# Patient Record
Sex: FEMALE | Race: WHITE | URBAN - METROPOLITAN AREA
[De-identification: names, ages, dates, MRNs, and addresses within clinical notes are randomized per-mention and may not be internally consistent; named-entity substitution may affect disease eponyms.]

---
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
## 2024-01-16 ENCOUNTER — HOSPITAL ENCOUNTER (EMERGENCY)
Facility: OTHER | Age: 19
Discharge: HOME | End: 2024-01-16
Attending: EMERGENCY MEDICINE
Payer: COMMERCIAL

## 2024-01-16 ENCOUNTER — OFFICE VISIT (OUTPATIENT)
Dept: URGENT CARE | Facility: CLINIC | Age: 19
End: 2024-01-16
Payer: COMMERCIAL

## 2024-01-16 VITALS
HEART RATE: 76 BPM | OXYGEN SATURATION: 100 % | DIASTOLIC BLOOD PRESSURE: 71 MMHG | TEMPERATURE: 97 F | RESPIRATION RATE: 18 BRPM | SYSTOLIC BLOOD PRESSURE: 118 MMHG

## 2024-01-16 VITALS
BODY MASS INDEX: 22.22 KG/M2 | WEIGHT: 150 LBS | SYSTOLIC BLOOD PRESSURE: 114 MMHG | OXYGEN SATURATION: 99 % | TEMPERATURE: 98 F | HEART RATE: 76 BPM | DIASTOLIC BLOOD PRESSURE: 72 MMHG | RESPIRATION RATE: 18 BRPM | HEIGHT: 69 IN

## 2024-01-16 DIAGNOSIS — R07.9 CHEST PAIN: ICD-10-CM

## 2024-01-16 DIAGNOSIS — M43.6 TORTICOLLIS: Primary | ICD-10-CM

## 2024-01-16 DIAGNOSIS — R07.89 CHEST WALL PAIN: Primary | ICD-10-CM

## 2024-01-16 DIAGNOSIS — R06.89 BREATHING DIFFICULTY: ICD-10-CM

## 2024-01-16 DIAGNOSIS — R07.89 TENDERNESS OF CHEST WALL: ICD-10-CM

## 2024-01-16 DIAGNOSIS — R07.1 PAINFUL BREATHING: ICD-10-CM

## 2024-01-16 LAB
ALBUMIN SERPL BCP-MCNC: 4.1 G/DL (ref 3.5–5.2)
ALP SERPL-CCNC: 48 U/L (ref 55–135)
ALT SERPL W/O P-5'-P-CCNC: 17 U/L (ref 10–44)
ANION GAP SERPL CALC-SCNC: 8 MMOL/L (ref 8–16)
AST SERPL-CCNC: 14 U/L (ref 10–40)
B-HCG UR QL: NEGATIVE
BASOPHILS # BLD AUTO: 0.02 K/UL (ref 0–0.2)
BASOPHILS NFR BLD: 0.3 % (ref 0–1.9)
BILIRUB SERPL-MCNC: 0.9 MG/DL (ref 0.1–1)
BNP SERPL-MCNC: 29 PG/ML (ref 0–99)
BUN SERPL-MCNC: 14 MG/DL (ref 6–20)
CALCIUM SERPL-MCNC: 9.4 MG/DL (ref 8.7–10.5)
CHLORIDE SERPL-SCNC: 108 MMOL/L (ref 95–110)
CO2 SERPL-SCNC: 23 MMOL/L (ref 23–29)
CREAT SERPL-MCNC: 0.7 MG/DL (ref 0.5–1.4)
CTP QC/QA: YES
CTP QC/QA: YES
D DIMER PPP IA.FEU-MCNC: 0.45 MG/L FEU
DIFFERENTIAL METHOD BLD: ABNORMAL
EOSINOPHIL # BLD AUTO: 0 K/UL (ref 0–0.5)
EOSINOPHIL NFR BLD: 0.4 % (ref 0–8)
ERYTHROCYTE [DISTWIDTH] IN BLOOD BY AUTOMATED COUNT: 12.5 % (ref 11.5–14.5)
EST. GFR  (NO RACE VARIABLE): >60 ML/MIN/1.73 M^2
GLUCOSE SERPL-MCNC: 101 MG/DL (ref 70–110)
HCT VFR BLD AUTO: 35.1 % (ref 37–48.5)
HGB BLD-MCNC: 11.9 G/DL (ref 12–16)
IMM GRANULOCYTES # BLD AUTO: 0.01 K/UL (ref 0–0.04)
IMM GRANULOCYTES NFR BLD AUTO: 0.1 % (ref 0–0.5)
LYMPHOCYTES # BLD AUTO: 0.7 K/UL (ref 1–4.8)
LYMPHOCYTES NFR BLD: 9.8 % (ref 18–48)
MCH RBC QN AUTO: 29.8 PG (ref 27–31)
MCHC RBC AUTO-ENTMCNC: 33.9 G/DL (ref 32–36)
MCV RBC AUTO: 88 FL (ref 82–98)
MONOCYTES # BLD AUTO: 0.5 K/UL (ref 0.3–1)
MONOCYTES NFR BLD: 7 % (ref 4–15)
NEUTROPHILS # BLD AUTO: 5.6 K/UL (ref 1.8–7.7)
NEUTROPHILS NFR BLD: 82.4 % (ref 38–73)
NRBC BLD-RTO: 0 /100 WBC
PLATELET # BLD AUTO: 232 K/UL (ref 150–450)
PMV BLD AUTO: 9 FL (ref 9.2–12.9)
POTASSIUM SERPL-SCNC: 4 MMOL/L (ref 3.5–5.1)
PROT SERPL-MCNC: 7.2 G/DL (ref 6–8.4)
RBC # BLD AUTO: 4 M/UL (ref 4–5.4)
SARS-COV-2 AG RESP QL IA.RAPID: NEGATIVE
SODIUM SERPL-SCNC: 139 MMOL/L (ref 136–145)
TROPONIN I SERPL DL<=0.01 NG/ML-MCNC: <0.006 NG/ML (ref 0–0.03)
WBC # BLD AUTO: 6.83 K/UL (ref 3.9–12.7)

## 2024-01-16 PROCEDURE — 25000003 PHARM REV CODE 250

## 2024-01-16 PROCEDURE — 85025 COMPLETE CBC W/AUTO DIFF WBC: CPT

## 2024-01-16 PROCEDURE — 99213 OFFICE O/P EST LOW 20 MIN: CPT | Mod: S$GLB,,, | Performed by: NURSE PRACTITIONER

## 2024-01-16 PROCEDURE — 93010 ELECTROCARDIOGRAM REPORT: CPT | Mod: ,,, | Performed by: INTERNAL MEDICINE

## 2024-01-16 PROCEDURE — 99285 EMERGENCY DEPT VISIT HI MDM: CPT | Mod: 25

## 2024-01-16 PROCEDURE — 93005 ELECTROCARDIOGRAM TRACING: CPT

## 2024-01-16 PROCEDURE — 87811 SARS-COV-2 COVID19 W/OPTIC: CPT | Mod: QW,S$GLB,, | Performed by: NURSE PRACTITIONER

## 2024-01-16 PROCEDURE — 81025 URINE PREGNANCY TEST: CPT

## 2024-01-16 PROCEDURE — 83880 ASSAY OF NATRIURETIC PEPTIDE: CPT

## 2024-01-16 PROCEDURE — 84484 ASSAY OF TROPONIN QUANT: CPT

## 2024-01-16 PROCEDURE — 85379 FIBRIN DEGRADATION QUANT: CPT

## 2024-01-16 PROCEDURE — 80053 COMPREHEN METABOLIC PANEL: CPT

## 2024-01-16 RX ORDER — CYCLOBENZAPRINE HCL 5 MG
5 TABLET ORAL NIGHTLY
Qty: 7 TABLET | Refills: 0 | Status: SHIPPED | OUTPATIENT
Start: 2024-01-16 | End: 2024-01-23

## 2024-01-16 RX ORDER — METHOCARBAMOL 500 MG/1
500 TABLET, FILM COATED ORAL
Status: COMPLETED | OUTPATIENT
Start: 2024-01-16 | End: 2024-01-16

## 2024-01-16 RX ORDER — IBUPROFEN 600 MG/1
600 TABLET ORAL 3 TIMES DAILY
Qty: 9 TABLET | Refills: 0 | Status: SHIPPED | OUTPATIENT
Start: 2024-01-16 | End: 2024-01-19

## 2024-01-16 RX ORDER — IBUPROFEN 200 MG
600 TABLET ORAL
Status: COMPLETED | OUTPATIENT
Start: 2024-01-16 | End: 2024-01-16

## 2024-01-16 RX ORDER — LEVONORGESTREL/ETHIN.ESTRADIOL 0.1-0.02MG
1 TABLET ORAL DAILY
COMMUNITY

## 2024-01-16 RX ORDER — BUSPIRONE HYDROCHLORIDE 10 MG/1
10 TABLET ORAL 3 TIMES DAILY
COMMUNITY

## 2024-01-16 RX ADMIN — Medication 600 MG: at 09:01

## 2024-01-16 RX ADMIN — METHOCARBAMOL 500 MG: 500 TABLET ORAL at 09:01

## 2024-01-16 NOTE — PROGRESS NOTES
"Subjective:      Patient ID: Michaela Jc is a 18 y.o. female.    Vitals:  height is 5' 9" (1.753 m) and weight is 68 kg (150 lb). Her oral temperature is 98.2 °F (36.8 °C). Her blood pressure is 114/72 and her pulse is 76. Her respiration is 18 and oxygen saturation is 99%.     Chief Complaint: Breathing Problem    Patient present with be unable to take deep breath pain stay to the right side chest and upper back she started having neck pain three days ago thought that she slept wrong.  The last two days ago that she it worst at night not being able to breath has not anything going on.  Provider note begins below    Denies trauma or injury.  No fevers, cough or rash.  Patient feels like initially it started in her neck.  She felt maybe that she slept wrong.  She is tried 600 mg of ibuprofen 2 separate times.  To go back to sleep after taking it.    Breathing Problem  She complains of difficulty breathing and shortness of breath. There is no chest tightness, cough, frequent throat clearing, hemoptysis, hoarse voice, sputum production or wheezing. Episode onset: 3 days. The problem has been gradually worsening. Associated symptoms include myalgias. Pertinent negatives include no appetite change, chest pain, dyspnea on exertion, ear congestion, ear pain, fever, headaches, heartburn, malaise/fatigue, nasal congestion, orthopnea, PND, postnasal drip, rhinorrhea, sneezing, sore throat, sweats, trouble swallowing or weight loss. Associated symptoms comments: When trying to take a deep breath or just laying down . Her symptoms are aggravated by nothing. Her symptoms are alleviated by nothing. She reports no improvement on treatment. There is no history of asthma, bronchiectasis, bronchitis, COPD, emphysema or pneumonia.       Constitution: Negative for appetite change, sweating, fatigue and fever.   HENT:  Negative for ear pain, postnasal drip, sore throat and trouble swallowing.    Cardiovascular:  Negative for chest " trauma, chest pain, leg swelling, palpitations and sob on exertion.   Respiratory:  Positive for shortness of breath. Negative for cough, sputum production, bloody sputum and wheezing.    Gastrointestinal:  Negative for nausea, vomiting, constipation, diarrhea and heartburn.   Musculoskeletal:  Positive for muscle ache.   Skin:  Negative for rash.   Allergic/Immunologic: Negative for sneezing.   Neurological:  Negative for headaches.      Objective:     Physical Exam   Constitutional: She is oriented to person, place, and time.   HENT:   Head: Normocephalic and atraumatic.   Neck:       Cardiovascular: Normal rate and regular rhythm.   Pulmonary/Chest: Effort normal and breath sounds normal. No stridor. No respiratory distress. She has no decreased breath sounds. She has no wheezes. She has no rales. She exhibits tenderness.       Abdominal: Normal appearance.   Neurological: She is alert and oriented to person, place, and time.   Skin: Skin is warm and dry.   Psychiatric: Her behavior is normal. Mood normal.           Assessment:     1. Torticollis    2. Painful breathing    3. Tenderness of chest wall    4. Breathing difficulty        Plan:   COVID test negative  Patient declines Toradol injection   Will do ibuprofen 3 times a day for 3 days   Muscle relaxer   contoured pillow   Chest x-ray today.  Appointment made for chest x-ray through clinic .        Torticollis  -     X-Ray Chest PA And Lateral; Future; Expected date: 01/16/2024  -     ibuprofen (ADVIL,MOTRIN) 600 MG tablet; Take 1 tablet (600 mg total) by mouth 3 (three) times daily. for 3 days  Dispense: 9 tablet; Refill: 0  -     cyclobenzaprine (FLEXERIL) 5 MG tablet; Take 1 tablet (5 mg total) by mouth nightly. for 7 doses  Dispense: 7 tablet; Refill: 0  -     ibuprofen tablet 600 mg    Painful breathing  -     SARS Coronavirus 2 Antigen, POCT Manual Read    Tenderness of chest wall  -     X-Ray Chest PA And Lateral; Future; Expected date:  01/16/2024  -     ibuprofen (ADVIL,MOTRIN) 600 MG tablet; Take 1 tablet (600 mg total) by mouth 3 (three) times daily. for 3 days  Dispense: 9 tablet; Refill: 0  -     cyclobenzaprine (FLEXERIL) 5 MG tablet; Take 1 tablet (5 mg total) by mouth nightly. for 7 doses  Dispense: 7 tablet; Refill: 0    Breathing difficulty

## 2024-01-16 NOTE — ED PROVIDER NOTES
Source of History:  Patient    Chief complaint:  Chest Pain (Reports R upper chest pain x 4 days. States its difficult to take a deep breath due to pain. Had R shoulder pain but states its resolved. Sent from  for further evaluation. Hx of asthma)      HPI:  Michaela Jc is a 18 y.o. female presenting with right-sided chest pain x3 days.  Patient states that she woke up 3 days ago and was having sharp pain to the right upper chest, right neck, and right shoulder.  States that the pain to her neck and shoulder have resolved, but she persists with pain to her right upper chest.  She reports the pain is worse with deep breathing.  Does not worsen with ambulation.  Denies any pain with movement of her right arm.  Denies any shortness of breath.  Reports that she went to urgent care earlier today, who ordered a chest x-ray, however there equipment was not working and therefore she was advised to present here.  She denies any cough, congestion, fever, chills, abdominal pain, nausea, vomiting, diarrhea.  Denies any trauma or injury, recent increased physical activity, heavy lifting.  Reports that she does take oral birth control along with medication for anxiety.    This is the extent to the patients complaints today here in the emergency department.    PMH:  As per HPI and below:  History reviewed. No pertinent past medical history.  History reviewed. No pertinent surgical history.    Social History     Tobacco Use    Smoking status: Never     Passive exposure: Never    Smokeless tobacco: Never   Substance Use Topics    Alcohol use: Yes    Drug use: Never       Review of patient's allergies indicates:  No Known Allergies    ROS: As per HPI and below:  Constitutional: No fever.  No chills.  Eyes: No visual changes.  ENT: No sore throat. No ear pain.  Cardiovascular:  Positive for chest pain.  Respiratory:  No shortness of breath.  GI: No abdominal pain.  No nausea or vomiting.  Genitourinary: No abnormal  urination.  Neurologic: No headache. No focal weakness.  No numbness.  MSK: no back pain.  Integument: No rashes or lesions.    Physical Exam:    /71   Pulse 76   Temp 97.4 °F (36.3 °C) (Oral)   Resp 18   LMP 12/16/2023 (Approximate)   SpO2 100%   Breastfeeding No   Vitals:    01/16/24 0930 01/16/24 1000 01/16/24 1015 01/16/24 1030   BP: (!) 115/55 130/73  118/71   Pulse: 88 80  84   Resp: 18      Temp: 97.4 °F (36.3 °C)      TempSrc: Oral      SpO2: 100%  100% 99%    01/16/24 1045   BP:    Pulse: 76   Resp:    Temp:    TempSrc:    SpO2: 100%       Nursing note and vital signs reviewed.  Constitutional: No acute distress.  Well-appearing, non-toxic.  Eyes: No conjunctival injection.  Extraocular muscles are intact.  ENT: Oropharynx clear.   Cardiovascular:  Regular rate and rhythm no murmurs gallops or rubs.  Chest/ Respiratory:  Clear to auscultation bilaterally without wheezing rhonchi or rales.  Tenderness to palpation of the right upper chest wall overlying the pectoralis major muscle, no overlying erythema, swelling, deformity, crepitus.  Abdomen: Soft.  Not distended.  Nontender.  No guarding.  No rebound. Non-peritoneal.  Musculoskeletal: Good range of motion all joints.  No deformities.  Neck supple.  No meningismus.  Skin: No rashes seen.  Good turgor.  No abrasions.  No ecchymoses.  Neuro: alert and oriented x3,  no focal neurological deficits.  Psych: Appropriate, conversant    Initial MDM:  Urgent evaluation of 18-year-old female with no significant past medical history presenting for evaluation of chest pain times 3 days.  Afebrile and hemodynamically stable.  Exam findings as described above.  Does have tenderness to palpation of the right anterior chest wall.  Suspect likely musculoskeletal etiology given this, however given patient reports pain with deep inspiration, we will obtain cardiac workup, D-dimer to rule out PE.  Patient on oral contraception therefore does not meet PERC  criteria.    Labs that have been ordered have been independently reviewed and interpreted by myself.    Labs Reviewed   CBC W/ AUTO DIFFERENTIAL - Abnormal; Notable for the following components:       Result Value    Hemoglobin 11.9 (*)     Hematocrit 35.1 (*)     MPV 9.0 (*)     Lymph # 0.7 (*)     Gran % 82.4 (*)     Lymph % 9.8 (*)     All other components within normal limits   COMPREHENSIVE METABOLIC PANEL - Abnormal; Notable for the following components:    Alkaline Phosphatase 48 (*)     All other components within normal limits   TROPONIN I   B-TYPE NATRIURETIC PEPTIDE   D DIMER, QUANTITATIVE   POCT URINE PREGNANCY       X-Ray Chest PA And Lateral   Final Result      No acute abnormality.         Electronically signed by: Bhaskar Jessica MD   Date:    01/16/2024   Time:    10:22          No results found for this or any previous visit.    Differential Diagnosis:  Differential Diagnosis includes, but is not limited to:  ACS/MI, PE, aortic dissection, pneumothorax, cardiac tamponade, pericarditis/myocarditis, pneumonia, infection/abscess, lung mass, costochondritis/pleurisy, GERD, biliary disease, pancreatitis    MDM  Medical Decision Making  Workup reassuring, see ED course for workup, no evidence of ACS, pneumothorax, pneumonia, PE.  Patient reported improvement in pain after Robaxin, was given ibuprofen at urgent care.  Given reassuring workup, reproducible pain, suspect likely musculoskeletal etiology of pain.  Patient was already prescribed ibuprofen and muscle relaxers at urgent Care, advised patient that she may continue these medications along with Tylenol for symptomatic treatment.  Advised ice/heat as well.  Referral provided to primary care given patient is a student here in it does not have a primary care provider here.  Patient was advised to follow up with PCP, strict ER return precautions given.  She verbalized understanding of discharge instructions and was in agreement. I discussed this case  with my attending provider, Dr. Avila, who was in agreement with plan.     Amount and/or Complexity of Data Reviewed  Labs: ordered. Decision-making details documented in ED Course.  Radiology: ordered.  ECG/medicine tests:  Decision-making details documented in ED Course.    Risk  Prescription drug management.        ED Course as of 01/16/24 1717   Tue Jan 16, 2024   0944 EKG 12-lead  Normal sinus rhythm with slight sinus arrhythmia present, rate of 69, no STEMI. [AK]   1038 Chest x-ray with no evidence consolidation, pleural effusion, pneumothorax. [SW]   1046 Preg Test, Ur: Negative [SW]   1046 CMP no significant electrolyte abnormality, normal renal function, no elevation in liver enzymes. [SW]   1046 CBC with very mild anemia, H&H of 11.9/35.1, otherwise unremarkable, no leukocytosis. [SW]   1047 D-Dimer: 0.45 [SW]   1047 Troponin I: <0.006 [SW]   1047 BNP: 29 [SW]      ED Course User Index  [AK] Sandra Avila MD  [] Juliana Feliciano PA-C       Diagnostic Impression:    1. Chest wall pain    2. Chest pain         ED Disposition Condition    Discharge Stable            ED Prescriptions    None       Follow-up Information       Follow up With Specialties Details Why Contact Info Additional Information    [facility] Medicine Internal Medicine Schedule an appointment as soon as possible for a visit in 1 week  [address] [phone] Internal Medicine - [facility], 8th Floor, [address] Please park in [place] and use [place] elevators    [facility] - Emergency Dept Emergency Medicine Go to  If symptoms worsen [address] [id number] [phone]              Juliana Feliciano PA-C  01/16/24 5232

## 2024-01-16 NOTE — ED NOTES
Pt to ED with R chest wall pain, pain with inspiration x4 days, seen at  and advised to come to ED for further eval. NAD noted, VSS. Denies sick contacts, cardiac Hx, trauma. Has only taking ibuprofen for pain.

## 2024-01-16 NOTE — DISCHARGE INSTRUCTIONS

## 2024-01-16 NOTE — LETTER
January 16, 2024      Urgent Care [address] [address]  Phone: [phone]  Fax: [phone]       Patient: Michaela Jc   YOB: 2005  Date of Visit: 01/16/2024    To Whom It May Concern:    Heraclio Jc  was at [facility] on 01/16/2024. The patient may return to work/school on 1/17/2024 with no restrictions. If you have any questions or concerns, or if I can be of further assistance, please do not hesitate to contact me.    Sincerely,    Charu Lam, NP

## 2024-01-16 NOTE — Clinical Note
"Michaela Crumpanda" Hosea was seen and treated in our emergency department on 1/16/2024.  She may return to school on 01/17/2024.      If you have any questions or concerns, please don't hesitate to call.      Juliana Feliciano, VENICE"

## 2024-01-16 NOTE — PATIENT INSTRUCTIONS
Recommend contoured pillow, warm compresses, stretching massage.  ibuprofen  Flexeril.    Follow-up if symptoms worsen or do not improve.  Upper body strengthening exercise

## 2024-04-15 ENCOUNTER — OFFICE VISIT (OUTPATIENT)
Dept: URGENT CARE | Facility: CLINIC | Age: 19
End: 2024-04-15
Payer: COMMERCIAL

## 2024-04-15 VITALS
DIASTOLIC BLOOD PRESSURE: 71 MMHG | OXYGEN SATURATION: 97 % | SYSTOLIC BLOOD PRESSURE: 122 MMHG | HEIGHT: 69 IN | BODY MASS INDEX: 22.22 KG/M2 | WEIGHT: 150 LBS | TEMPERATURE: 98 F | HEART RATE: 88 BPM | RESPIRATION RATE: 20 BRPM

## 2024-04-15 DIAGNOSIS — S83.91XA SPRAIN OF RIGHT KNEE, UNSPECIFIED LIGAMENT, INITIAL ENCOUNTER: ICD-10-CM

## 2024-04-15 DIAGNOSIS — M25.561 RIGHT KNEE PAIN, UNSPECIFIED CHRONICITY: Primary | ICD-10-CM

## 2024-04-15 PROCEDURE — 99214 OFFICE O/P EST MOD 30 MIN: CPT | Mod: S$GLB,,, | Performed by: FAMILY MEDICINE

## 2024-04-15 PROCEDURE — 73562 X-RAY EXAM OF KNEE 3: CPT | Mod: RT,S$GLB,, | Performed by: RADIOLOGY

## 2024-04-15 RX ORDER — NAPROXEN 375 MG/1
375 TABLET ORAL 2 TIMES DAILY
Qty: 20 TABLET | Refills: 0 | Status: SHIPPED | OUTPATIENT
Start: 2024-04-15

## 2024-04-15 NOTE — PROGRESS NOTES
"Subjective:      Patient ID: Michaela Jc is a 19 y.o. female.    Vitals:  height is 5' 9" (1.753 m) and weight is 68 kg (150 lb). Her temperature is 98.2 °F (36.8 °C). Her blood pressure is 122/71 and her pulse is 88. Her respiration is 20 and oxygen saturation is 97%.     Chief Complaint: Knee Pain    Pt presents with complaint of right knee pain.  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  States her knee joint suddenly gave way while standing 3 days ago.  States similar episode also happened 2 months ago.  Pt states she has pain medially and states she has not taken any medication for her pain.  Pt states she has no hx of surgeries or fractures to her knee.  Denies any history of gout or arthritis.    Knee Pain   The incident occurred 3 to 5 days ago. The incident occurred at home. There was no injury mechanism. The pain is present in the right knee. The quality of the pain is described as aching. The pain is at a severity of 5/10. The pain is moderate. The pain has been Fluctuating since onset. She reports no foreign bodies present. The symptoms are aggravated by palpation. She has tried nothing for the symptoms. The treatment provided no relief.     ROS   Objective:     Physical Exam   Constitutional: She is oriented to person, place, and time. She appears well-developed. She is cooperative.   HENT:   Head: Normocephalic and atraumatic.   Ears:   Right Ear: Hearing normal.   Left Ear: Hearing normal.   Nose: Nose normal. No mucosal edema or nasal deformity. No epistaxis. Right sinus exhibits no maxillary sinus tenderness and no frontal sinus tenderness. Left sinus exhibits no maxillary sinus tenderness and no frontal sinus tenderness.   Mouth/Throat: Uvula is midline, oropharynx is clear and moist and mucous membranes are normal. No trismus in the jaw. Normal dentition. No uvula swelling.   Eyes: Conjunctivae and lids are normal.   Neck: Trachea normal and phonation normal. Neck " supple.   Cardiovascular: Normal rate, regular rhythm, normal heart sounds and normal pulses.   No murmur heard.  Pulmonary/Chest: Effort normal and breath sounds normal. No stridor. No respiratory distress. She has no wheezes. She has no rhonchi. She has no rales.   Abdominal: Normal appearance and bowel sounds are normal. Soft.   Musculoskeletal: Normal range of motion.         General: Normal range of motion.      Comments:   R Knee Exam:  No significant swelling.   No redness, swelling, bruise.  No warmth.  No obvious effusion  +ve tenderness to   Joint laxity WNL  Negative Lachman's and posterior draw test  Painful varus and valgus stress test  No clicking, popping or crepitus noted  ROM fair.      Neurological: She is alert and oriented to person, place, and time. She exhibits normal muscle tone.   Skin: Skin is warm, dry and intact.   Psychiatric: Her speech is normal and behavior is normal. Judgment and thought content normal.   Nursing note and vitals reviewed.      Assessment:     1. Right knee pain, unspecified chronicity    2. Sprain of right knee, unspecified ligament, initial encounter        Plan:   Extra knees unremarkable.    Discussed exam findings/results/diagnosis/plan with patient.  Knee precautions advised.  Ortho referral given for further evaluation.. ER precautions given if symptoms get any worse. All questions answered. Patient verbally understood and agreed with treatment plan.  Educational materials and instructions regarding the visit diagnosis and management provided.     Right knee pain, unspecified chronicity  -     X-Ray Knee 3 View Right; Future; Expected date: 04/15/2024  -     KNEE BRACE FOR HOME USE  -     Ambulatory referral/consult to Orthopedics    Sprain of right knee, unspecified ligament, initial encounter  -     KNEE BRACE FOR HOME USE  -     Ambulatory referral/consult to Orthopedics    Other orders  -     naproxen (EC-NAPROSYN) 375 MG TbEC EC tablet; Take 1 tablet (375  mg total) by mouth 2 (two) times daily.  Dispense: 20 tablet; Refill: 0

## 2024-04-16 ENCOUNTER — TELEPHONE (OUTPATIENT)
Dept: ORTHOPEDICS | Facility: CLINIC | Age: 19
End: 2024-04-16
Payer: COMMERCIAL

## 2024-04-19 ENCOUNTER — OFFICE VISIT (OUTPATIENT)
Dept: SPORTS MEDICINE | Facility: CLINIC | Age: 19
End: 2024-04-19
Payer: COMMERCIAL

## 2024-04-19 ENCOUNTER — HOSPITAL ENCOUNTER (OUTPATIENT)
Dept: RADIOLOGY | Facility: HOSPITAL | Age: 19
Discharge: HOME | End: 2024-04-19
Attending: PHYSICIAN ASSISTANT
Payer: COMMERCIAL

## 2024-04-19 VITALS
DIASTOLIC BLOOD PRESSURE: 75 MMHG | WEIGHT: 160.94 LBS | HEIGHT: 69 IN | HEART RATE: 104 BPM | SYSTOLIC BLOOD PRESSURE: 112 MMHG | BODY MASS INDEX: 23.84 KG/M2

## 2024-04-19 DIAGNOSIS — M25.561 ACUTE PAIN OF RIGHT KNEE: Primary | ICD-10-CM

## 2024-04-19 DIAGNOSIS — M22.01 RECURRENT DISLOCATION OF PATELLA, RIGHT KNEE: ICD-10-CM

## 2024-04-19 DIAGNOSIS — S83.004A PATELLAR DISLOCATION, RIGHT, INITIAL ENCOUNTER: ICD-10-CM

## 2024-04-19 DIAGNOSIS — M25.361 PATELLAR INSTABILITY OF RIGHT KNEE: ICD-10-CM

## 2024-04-19 DIAGNOSIS — M25.561 RIGHT KNEE PAIN, UNSPECIFIED CHRONICITY: ICD-10-CM

## 2024-04-19 PROCEDURE — 3008F BODY MASS INDEX DOCD: CPT | Mod: CPTII,S$GLB,, | Performed by: PHYSICIAN ASSISTANT

## 2024-04-19 PROCEDURE — 3074F SYST BP LT 130 MM HG: CPT | Mod: CPTII,S$GLB,, | Performed by: PHYSICIAN ASSISTANT

## 2024-04-19 PROCEDURE — 99999 PR PBB SHADOW E&M-EST. PATIENT-LVL IV: CPT | Mod: PBBFAC,,, | Performed by: PHYSICIAN ASSISTANT

## 2024-04-19 PROCEDURE — 99204 OFFICE O/P NEW MOD 45 MIN: CPT | Mod: S$GLB,,, | Performed by: PHYSICIAN ASSISTANT

## 2024-04-19 PROCEDURE — 1159F MED LIST DOCD IN RCRD: CPT | Mod: CPTII,S$GLB,, | Performed by: PHYSICIAN ASSISTANT

## 2024-04-19 PROCEDURE — 3078F DIAST BP <80 MM HG: CPT | Mod: CPTII,S$GLB,, | Performed by: PHYSICIAN ASSISTANT

## 2024-04-19 PROCEDURE — 73564 X-RAY EXAM KNEE 4 OR MORE: CPT | Mod: 26,50,, | Performed by: RADIOLOGY

## 2024-04-19 PROCEDURE — 1160F RVW MEDS BY RX/DR IN RCRD: CPT | Mod: CPTII,S$GLB,, | Performed by: PHYSICIAN ASSISTANT

## 2024-04-19 PROCEDURE — 73564 X-RAY EXAM KNEE 4 OR MORE: CPT | Mod: TC,50

## 2024-04-19 NOTE — PROGRESS NOTES
CC: Right knee pain    19 y.o. Female [employer] Student with a history of Right knee pain. She states that the pain is severe and not responding to any conservative care.      She reports 2 incident of patella instability that occurs in February 2024 and 1 week ago. First time she was bent down at a water cooler and twisted the knee causing her to fall over. Second time she was standing in line and went to talk a step. Knee was swollen after both incidents and she reports medial peripatellar knee pain. Swelling improved both times with icing.     Neg mechanical symptoms, + patella instability    SANE initially was 40 and now 60 today.     Is affecting ADLs.      Review of Systems   Constitution: Negative. Negative for chills, fever and night sweats.   HENT: Negative for congestion and headaches.    Eyes: Negative for blurred vision, left vision loss and right vision loss.   Cardiovascular: Negative for chest pain and syncope.   Respiratory: Negative for cough and shortness of breath.    Endocrine: Negative for polydipsia, polyphagia and polyuria.   Hematologic/Lymphatic: Negative for bleeding problem. Does not bruise/bleed easily.   Skin: Negative for dry skin, itching and rash.   Musculoskeletal: Negative for falls. Positive for knee pain and muscle weakness.   Gastrointestinal: Negative for abdominal pain and bowel incontinence.   Genitourinary: Negative for bladder incontinence and nocturia.   Neurological: Negative for disturbances in coordination, loss of balance and seizures.   Psychiatric/Behavioral: Negative for depression. The patient does not have insomnia.    Allergic/Immunologic: Negative for hives and persistent infections.   All other systems negative.    PAST MEDICAL HISTORY: No past medical history on file.  PAST SURGICAL HISTORY: No past surgical history on file.  FAMILY HISTORY:   Family History   Problem Relation Name Age of Onset    No Known Problems Mother      No Known Problems Father       SOCIAL  "HISTORY:   Social History     Socioeconomic History    Marital status: Single   Tobacco Use    Smoking status: Never     Passive exposure: Never    Smokeless tobacco: Never   Substance and Sexual Activity    Alcohol use: Yes    Drug use: Never    Sexual activity: Never       MEDICATIONS:   Current Outpatient Medications:     busPIRone (BUSPAR) 10 MG tablet, Take 10 mg by mouth 3 (three) times daily., Disp: , Rfl:     levonorgestrel-ethinyl estradiol (AVIANE,ALESSE,LESSINA) 0.1-20 mg-mcg per tablet, Take 1 tablet by mouth once daily., Disp: , Rfl:     naproxen (EC-NAPROSYN) 375 MG TbEC EC tablet, Take 1 tablet (375 mg total) by mouth 2 (two) times daily., Disp: 20 tablet, Rfl: 0  ALLERGIES: Review of patient's allergies indicates:  No Known Allergies    VITAL SIGNS: /75   Pulse 104   Ht 5' 9" (1.753 m)   Wt 73 kg (160 lb 15 oz)   BMI 23.77 kg/m²      PHYSICAL EXAMINATION  VITAL SIGNS: /75   Pulse 104   Ht 5' 9" (1.753 m)   Wt 73 kg (160 lb 15 oz)   BMI 23.77 kg/m²    General:  The patient is alert and oriented x 3.  Mood is pleasant.  Observation of ears, eyes and nose reveal no gross abnormalities.  HEENT: NCAT, sclera nonicteric  Lungs: Respirations are equal and unlabored.    Right KNEE EXAMINATION     OBSERVATION / INSPECTION   Gait:   Nonantalgic   Alignment:  Neutral   Scars:   None   Muscle atrophy: Mild  Effusion:  None   Warmth:  None   Discoloration:   none     TENDERNESS / CREPITUS (T / C):          T / C      T / C   Patella   - / -   Lateral joint line   - / -    Peripatellar medial  +  Medial joint line    - / -    Peripatellar lateral -  Medial plica   - / -    Patellar tendon +   Popliteal fossa  - / -    Quad tendon   -   Gastrocnemius   -   Prepatellar Bursa - / -   Quadricep   -   Tibial tubercle  -  Thigh/hamstring  -   Pes anserine/HS -  Fibula    -   ITB   - / -  Tibia     -   Tib/fib joint  - / -  LCL    -     MFC   + at MPFL insertion / -   MCL: Proximal  -    LFC   - / - "    Distal   -          ROM: (* = pain)  PASSIVE   ACTIVE    Left :    0  145   1  0  145     Right :     0  145   1  0  145    Patellofemoral examination:  See above noted areas of tenderness.   Patella position    Subluxation / dislocation: Centered           Sup. / Inf;   Normal   Crepitus (PF):    Absent   Patellar Mobility:       Medial-lateral:   2 quadrants with guarding on the right compared to 1 right    Superior-inferior:  Normal    Inferior tilt   Normal    Patellar tendon:  Normal   Lateral tilt:    Normal   J-sign:     +  Patellofemoral grind:   No pain       MENISCAL SIGNS:     Pain on terminal extension:  neg  Pain on terminal flexion:  +  Luhs maneuver:  neg  Squat     Not tested    LIGAMENT EXAMINATION:  ACL / Lachman:  normal (-1 to 2mm)    PCL-Post.  drawer: normal 0 to 2mm  MCL- Valgus:  normal 0 to 2mm  LCL- Varus:  normal 0 to 2mm  Pivot shift: normal (Equal)   Dial Test: difference c/w other side   At 30° flexion: normal (< 5°)    At 90° flexion: normal (< 5°)       STRENGTH: (* = with pain) PAINFUL SIDE   Quadricep   5/5   Hamstrin/5    EXTREMITY NEURO-VASCULAR EXAMINATION:   Sensation:  Grossly intact to light touch all dermatomal regions.   Motor Function:  Fully intact motor function at hip, knee, foot and ankle    DTRs;  quadriceps and  achilles 2+.  No clonus and downgoing Babinski.    Vascular status:  DP and PT pulses 2+, brisk capillary refill, symmetric.     Other Findings:       X-rays:  including standing, weight bearing AP and flexion bilateral knees, lateral and merchant views ordered and images reviewed by me show:  No fracture, dislocation     ASSESSMENT:    Right Knee pain, subacute   Right knee patella dislocation, recurrent   Right knee patella instability.     PLAN:   MRI right knee to assess MFPL and patella instability.   Hold out of sports, running, jumping until MRI  Continue ice compresses  OTC NSAIDs with food.   PT ordered for patella  instability.     Giovany pull knee brace to wear for walking and activity.   [id number] [address] duarte , performed a custom orthotic / brace adjustment, fitting and training with the patient. The patient demonstrated understanding and proper care. This was performed for 15 minutes.     Will call with results.   All questions were answered, pt will contact us for questions or concerns in the interim.    I made the decision to obtain old records of the patient including previous notes and imaging. New imaging was ordered today of the extremity or extremities evaluated. I independently reviewed and interpreted the radiographs and/or MRIs today as well as prior imaging.